# Patient Record
Sex: FEMALE | Race: WHITE
[De-identification: names, ages, dates, MRNs, and addresses within clinical notes are randomized per-mention and may not be internally consistent; named-entity substitution may affect disease eponyms.]

---

## 2017-05-23 ENCOUNTER — HOSPITAL ENCOUNTER (OUTPATIENT)
Dept: HOSPITAL 45 - C.MAMM | Age: 52
Discharge: HOME | End: 2017-05-23
Attending: OBSTETRICS & GYNECOLOGY
Payer: COMMERCIAL

## 2017-05-23 DIAGNOSIS — Z12.31: Primary | ICD-10-CM

## 2017-05-23 NOTE — MAMMOGRAPHY REPORT
BILATERAL DIGITAL SCREENING MAMMOGRAM TOMOSYNTHESIS WITH CAD: 5/23/2017

CLINICAL HISTORY: Routine screening.  Patient has no complaints.  





TECHNIQUE:  Breast tomosynthesis in addition to standard 2D mammography was performed. Current study
 was also evaluated with a Computer Aided Detection (CAD) system.  



COMPARISON: Comparison is made to exams dated:  5/20/2016 mammogram, 2/2/2015 mammogram, 12/18/2013 
mammogram, 12/14/2012 mammogram, 12/12/2011 mammogram, and 12/6/2010 mammogram - [facility].   



BREAST COMPOSITION:  The tissue of both breasts is heterogeneously dense, which may obscure small ma
sses.  



FINDINGS: There are scattered stable benign-appearing microcalcifications.  Stable asymmetries bilat
erally and benign-appearing circumscribed masses in the left breast.  No suspicious spiculated or ir
regular mass, architectural distortion or cluster of suspicious microcalcifications is seen.  



IMPRESSION:  ACR BI-RADS CATEGORY 1: NEGATIVE

There is no mammographic evidence of malignancy. A 1 year screening mammogram is recommended.  The p
atient will receive written notification of the results.  





Approximately 10% of breast cancers are not detected with mammography. A negative mammographic repor
t should not delay biopsy if a clinically suggestive mass is present.



Ashanti Narvaez M.D.          

ay/:5/23/2017 16:31:30  



Imaging Technologist: Jenni LUNDBERG(LUIS)(M)(BD), [facility]

letter sent: Normal 1/2  

BI-RADS Code: ACR BI-RADS Category 1: Negative

## 2017-10-12 ENCOUNTER — HOSPITAL ENCOUNTER (OUTPATIENT)
Dept: HOSPITAL 45 - C.GI | Age: 52
Discharge: HOME | End: 2017-10-12
Attending: INTERNAL MEDICINE
Payer: COMMERCIAL

## 2017-10-12 VITALS
WEIGHT: 135.28 LBS | BODY MASS INDEX: 26.56 KG/M2 | HEIGHT: 60 IN | WEIGHT: 135.28 LBS | HEIGHT: 60 IN | BODY MASS INDEX: 26.56 KG/M2

## 2017-10-12 VITALS — SYSTOLIC BLOOD PRESSURE: 139 MMHG | OXYGEN SATURATION: 100 % | HEART RATE: 67 BPM | DIASTOLIC BLOOD PRESSURE: 86 MMHG

## 2017-10-12 VITALS — TEMPERATURE: 97.7 F

## 2017-10-12 DIAGNOSIS — Z79.899: ICD-10-CM

## 2017-10-12 DIAGNOSIS — I10: ICD-10-CM

## 2017-10-12 DIAGNOSIS — Z98.51: ICD-10-CM

## 2017-10-12 DIAGNOSIS — F32.9: ICD-10-CM

## 2017-10-12 DIAGNOSIS — Z80.0: ICD-10-CM

## 2017-10-12 DIAGNOSIS — D12.3: ICD-10-CM

## 2017-10-12 DIAGNOSIS — K64.8: ICD-10-CM

## 2017-10-12 DIAGNOSIS — Z98.890: ICD-10-CM

## 2017-10-12 DIAGNOSIS — Z12.11: Primary | ICD-10-CM

## 2017-10-12 NOTE — DISCHARGE INSTRUCTIONS
Endoscopy Patient Instructions


Date / Procedure(s) Performed


Oct 12, 2017.


Colonoscopy





Allergy Information


Coded Allergies:  


     No Known Allergies (Unverified , 10/12/17)





Discharge Date / Findings


Oct 12, 2017.


Colon polyp


Internal hemorrhoids





Medication Instructions


OK to resume all medications today as prescribed





Reported Home Medications








 Medications  Dose


 Route/Sig


 Max Daily Dose Days Date Category


 


 Ocuvite


 Preservision


  (Multivitamins/Minerals)


 1 Tab Tab  1 Tab


 PO BID


    10/2/17 Reported


 


 Osteo Bi-Flex


 Triple Stre (Misc


 Natural Products)


 1 Tab Tab  1 Tab


 PO BID


    10/2/17 Reported


 


 Multivitamin


  (Multivitamins)


 Tab  1 Tab


 PO BID


    10/2/17 Reported


 


 Lutein


  (Lutein-Zeaxanthin)


 1 Cap Cap  1 Cap


 PO QAM


    10/2/17 Reported


 


 Dyazide


 37.5MG/25MG


  (Triamterene/HCTZ)


 Cap  1 Tab


 PO QAM


    10/2/17 Reported


 


 Prozac


  (Fluoxetine HCl)


 10 Mg Cap  10 Mg


 PO QAM


    10/2/17 Reported


 


 Pravachol


  (Pravastatin


 Sodium) 20 Mg Tab  20 Mg


 PO QPM


    10/2/17 Reported


 


 Micro-K Ext Rel


  (Potassium


 Chloride) 10 Meq


 Capcr  10 Meq


 PO QPM


    10/2/17 Reported


 


 Norvasc


  (Amlodipine


 Besylate) 5 Mg Tab  5 Mg


 PO QAM


    10/2/17 Reported











Provider Instructions





Activity Restrictions





-  No exercising or heavy lifting for 24 hours. 


-  Do not drink alcohol the day of the procedure.


-  Do not drive a car or operate machinery until the day after the procedure.


-  Do not make any important decisions or sign important papers in 24 hours 

after the procedure.





Following Day:





-  Return to full activity which may include returning to work/school.





Diet





Start your diet with liquids and light foods (jello, soup, juice, toast).  Then 

eat your usual diet if not nauseated.





Treatment For Common After Affects





For mild abdominal pain, bloating, or excessive gas:





-  Rest


-  Eat lightly


-  Lie on right side





Follow-Up Information


Follow-up with dr. silverio as scheduled





Anesthesia Information





What You Should Know





You have had a procedure that required some medicine to reduce anxiety and 

discomfort. This treatment is called moderate sedation.  


After receiving the treatment, you may be sleepy, but you will be able to 

breathe on your own.  The effects of the treatment may last for several hours.








Follow these instructions along with Activity/Diet recommendations noted above:





*  Do NOT do anything where dizziness or clumsiness would be dangerous.





*  Rest quietly at home today, then you can be up and about tomorrow.





*  Have a responsible person stay with you the rest of today.





*  You may have had an I.V. today.  If so, you may take the dressing off later 

today.





Recommendations


 


Call your doctor if:





*  Trouble breathing 





*  Continuous vomiting for more than 24 hours








*  Temperature above 101 degrees





*  Severe abdominal pain or bloating





*  Pain not relieved by pain medicine ordered





*  There is increased drainage or redness from any incision





*  A large amount of rectal bleeding greater than 2-3 tablespoons. 


   (If you had a polyp/s removed or have hemorrhoids, a small amount of blood -


    from the rectum is to be expected.)





*  You have any unanswered questions or concerns.








IN THE EVENT OF A SERIOUS EMERGENCY, GO TO THE NEAREST EMERGENCY ROOM








       Your discharge instructions were prepared by provider Laci Lorenzo.





 Patient Instructions Signature Page














Alissa Escamilla 











Patient (or Guardian) Signature/Date:____________________________________ I 

have read and understand the instructions given to me by my caregivers.








Caregiver/RN/Doctor Signature/Date:____________________________________











The above-named patient and/or guardian has received patient instructions on 

this date.





























+  Original Patient Signature Page (only) stays with chart.  Please make copy 

for patient.

## 2017-10-12 NOTE — ENDO HISTORY AND PHYSICAL
History & Physical


Date of Service:


Oct 12, 2017.


Chief Complaint:


screening


Referring Physician:


dr. silverio


History of Present Illness


51 yo CF who presents for colonoscopy secondary to family history of colon 

cancer.





Past Surgical History


Hx Cardiac Surgery:  No


Hx Internal Defibrillator:  No


Hx Pacemaker:  No


Hx Abdominal Surgery:  Yes (TUBAL LIGATION)


Hx of Implantable Prosthesis:  No


Hx Post-Op Nausea and Vomiting:  No


Hx Cancer Surgery:  No


Hx Thoracic Surgery:  No


Hx Orthopedic:  Yes (RT KNEE ARTHROSCOPY)


Hx Urinary Tract Surgery:  No





Family History


Colon CA





Social History


Smoking Status:  Never Smoker


Hx Substance Use:  No


Hx Alcohol Use:  No





Allergies


Coded Allergies:  


     No Known Allergies (Unverified , 10/12/17)





Current Medications





Reported Home Medications








 Medications  Dose


 Route/Sig


 Max Daily Dose Days Date Category


 


 Ocuvite


 Preservision


  (Multivitamins/Minerals)


 1 Tab Tab  1 Tab


 PO BID


    10/2/17 Reported


 


 Osteo Bi-Flex


 Triple Stre (Misc


 Natural Products)


 1 Tab Tab  1 Tab


 PO BID


    10/2/17 Reported


 


 Multivitamin


  (Multivitamins)


 Tab  1 Tab


 PO BID


    10/2/17 Reported


 


 Lutein


  (Lutein-Zeaxanthin)


 1 Cap Cap  1 Cap


 PO QAM


    10/2/17 Reported


 


 Dyazide


 37.5MG/25MG


  (Triamterene/HCTZ)


 Cap  1 Tab


 PO QAM


    10/2/17 Reported


 


 Prozac


  (Fluoxetine HCl)


 10 Mg Cap  10 Mg


 PO QAM


    10/2/17 Reported


 


 Pravachol


  (Pravastatin


 Sodium) 20 Mg Tab  20 Mg


 PO QPM


    10/2/17 Reported


 


 Micro-K Ext Rel


  (Potassium


 Chloride) 10 Meq


 Capcr  10 Meq


 PO QPM


    10/2/17 Reported


 


 Norvasc


  (Amlodipine


 Besylate) 5 Mg Tab  5 Mg


 PO QAM


    10/2/17 Reported











Vital Signs


Weight (Kilograms):  61.36


Height (Feet):  5


Height (Inches):  0











  Date Time  Temp Pulse Resp B/P (MAP) Pulse Ox O2 Delivery O2 Flow Rate FiO2


 


10/12/17 12:17 36.5 67 20 141/69 (93) 99 Room Air  











Physical Exam


General Appearance:  WD/WN, no apparent distress


Respiratory/Chest:  


   Auscultation:  breath sounds normal


Cardiovascular:  


   Heart Auscultation:  RRR


Abdomen:  


   Bowel Sounds:  normal


   Inspection & Palpation:  soft, non-distended, no tenderness, guarding & 

rebound





Assessment and Plan


Assessment:


51 yo CF who presents for colonoscopy secondary to family history of colon 

cancer.








Plan:


Proceed with colonoscopy.

## 2017-10-12 NOTE — GI REPORT
Procedure Date: 10/12/2017 12:13 PM

Procedure:            Colonoscopy

Indications:          Family history of colon cancer in a first-degree 

                      relative

Medicines:            Monitored Anesthesia Care

Complications:        No immediate complications.

Estimated Blood Loss: Estimated blood loss: none.

Procedure:            Pre-Anesthesia Assessment:

                      - Prior to the procedure, a History and Physical was 

                      performed, and patient medications and allergies were 

                      reviewed. The patient's tolerance of previous 

                      anesthesia was also reviewed. The risks and benefits of 

                      the procedure and the sedation options and risks were 

                      discussed with the patient. All questions were 

                      answered, and informed consent was obtained. Prior 

                      Anticoagulants: The patient has taken no previous 

                      anticoagulant or antiplatelet agents. ASA Grade 

                      Assessment: II - A patient with mild systemic disease. 

                      After reviewing the risks and benefits, the patient was 

                      deemed in satisfactory condition to undergo the 

                      procedure.

                      After I obtained informed consent, the scope was passed 

                      under direct vision. Throughout the procedure, the 

                      patient's blood pressure, pulse, and oxygen saturations 

                      were monitored continuously. The scope was introduced 

                      through the anus and advanced to the terminal ileum. 

                      The colonoscopy was performed without difficulty. The 

                      patient tolerated the procedure well. The quality of 

                      the bowel preparation was good. The terminal ileum, 

                      ileocecal valve, appendiceal orifice, and rectum were 

                      photographed.

Findings:

     A 4 mm polyp was found in the transverse colon. The polyp was sessile. 

     The polyp was removed with a cold snare. Resection and retrieval were 

     complete.

     Non-bleeding internal hemorrhoids were found during retroflexion. The 

     hemorrhoids were small.

Impression:           - One 4 mm polyp in the transverse colon, removed with 

                      a cold snare. Resected and retrieved.

                      - Non-bleeding internal hemorrhoids.

Recommendation:       - Resume previous diet.

                      - Continue present medications.

                      - Repeat colonoscopy for surveillance based on 

                      pathology results.

                      - Return to primary care physician as previously 

                      scheduled.

Laci Lorenzo DO

10/12/2017 1:17:59 PM

This report has been signed electronically.

Note Initiated On: 10/12/2017 12:13 PM

     I attest to the content of the Intraoperative Record and orders 

     documented therein, exceptions below

## 2017-10-12 NOTE — ANESTHESIOLOGY PROGRESS NOTE
Anesthesia Post Op Note


Date & Time


Oct 12, 2017 at 13:38





Vital Signs


Pain Intensity:  0





Vital Signs Past 12 Hours








  Date Time  Temp Pulse Resp B/P (MAP) Pulse Ox O2 Delivery O2 Flow Rate FiO2


 


10/12/17 13:31  83 16 133/86 (102) 99 Room Air  


 


10/12/17 13:16  80 16 110/70 (83) 99 Room Air  


 


10/12/17 12:17 36.5 67 20 141/69 (93) 99 Room Air  











Notes


Mental Status:  alert / awake / arousable, participated in evaluation


Pt Amnestic to Procedure:  Yes


Nausea / Vomiting:  adequately controlled


Pain:  adequately controlled


Airway Patency, RR, SpO2:  stable & adequate


BP & HR:  stable & adequate


Hydration State:  stable & adequate


Anesthetic Complications:  no major complications apparent

## 2018-05-16 ENCOUNTER — HOSPITAL ENCOUNTER (OUTPATIENT)
Dept: HOSPITAL 45 - C.PAPS | Age: 53
Discharge: HOME | End: 2018-05-16
Attending: OBSTETRICS & GYNECOLOGY
Payer: COMMERCIAL

## 2018-05-16 DIAGNOSIS — Z01.419: Primary | ICD-10-CM

## 2018-05-22 ENCOUNTER — HOSPITAL ENCOUNTER (OUTPATIENT)
Dept: HOSPITAL 45 - C.MAMM | Age: 53
Discharge: HOME | End: 2018-05-22
Attending: OBSTETRICS & GYNECOLOGY
Payer: COMMERCIAL

## 2018-05-22 DIAGNOSIS — Z12.31: Primary | ICD-10-CM

## 2018-05-23 NOTE — MAMMOGRAPHY REPORT
BILATERAL DIGITAL SCREENING MAMMOGRAM TOMOSYNTHESIS WITH CAD: 5/22/2018

CLINICAL HISTORY: Routine screening.  Patient has no complaints.  



There are fluctuating circumscribed masses in the breasts most compatible with fluctuating cysts.  Th
ere are scattered and loosely grouped benign-appearing punctate microcalcifications bilaterally.

TECHNIQUE:  Breast tomosynthesis in addition to standard 2D mammography was performed. Current study 
was also evaluated with a Computer Aided Detection (CAD) system.  



COMPARISON: Comparison is made to exams dated:  5/23/2017 mammogram, 5/20/2016 mammogram, 2/2/2015 ma
mmogram, 12/18/2013 mammogram, 12/14/2012 mammogram, and 12/6/2010 mammogram - [facility].   



BREAST COMPOSITION:  The tissue of both breasts is heterogeneously dense, which may obscure small mas
ses.  



FINDINGS:  There are fluctuating circumscribed masses in the breasts most compatible with fluctuating
 cysts.  There are scattered and loosely grouped benign-appearing punctate microcalcifications bilate
rally. No new suspicious mass, architectural distortion or cluster of microcalcifications is seen.  



IMPRESSION:  ACR BI-RADS CATEGORY 1: NEGATIVE

There is no mammographic evidence of malignancy. A 1 year screening mammogram is recommended.  The pa
tient will receive written notification of the results.  





Approximately 10% of breast cancers are not detected with mammography. A negative mammographic report
 should not delay biopsy if a clinically suggestive mass is present.



Ashanti Narvaez M.D.          

ay/:5/22/2018 16:50:38  



Imaging Technologist: Preeti RASCON)([email or web address]), [facility]

letter sent: Normal 1/2  

BI-RADS Code: ACR BI-RADS Category 1: Negative